# Patient Record
(demographics unavailable — no encounter records)

---

## 2024-10-31 NOTE — HISTORY OF PRESENT ILLNESS
[N] : Patient does not use contraception [Monogamous (Male Partner)] : is monogamous with a male partner [Y] : Positive pregnancy history [LMP unknown] : LMP unknown [Yes] : pregnancy [Currently Active] : currently active [Men] : men [Vaginal] : vaginal [No] : No [PGxTotal] : 7 [[facility]] : 3 [PGHxPremature] : 0 [PGHxAbortions] : 3 [[facility] Center[facility]] : 3 [PGHxABInduced] : 3 [FreeTextEntry1] : NSVDx3, TOPX3

## 2024-10-31 NOTE — PLAN
[FreeTextEntry1] : 30 y/o P3 at presenting with amenorrhea, LMP unknown, no prenatal care during pregnancy to date.   -f/u pap, GCT/CT   -f/u prenatal blood work drawn today   -Rx sent for PNV  - transabdominal U/S  revealed MADDY 02/14/2025, measuring 24.6 weeks today. limited U/S done   - PHQ-2  depression screen done, score 0   -f/u1 week for f/u anatomy and GCT/CBC

## 2024-10-31 NOTE — PHYSICAL EXAM
[Chaperone Present] : A chaperone was present in the examining room during all aspects of the physical examination [[id number]] : A chaperone was present during the pelvic exam. [Appropriately responsive] : appropriately responsive [Alert] : alert [No Acute Distress] : no acute distress [No Lymphadenopathy] : no lymphadenopathy [Soft] : soft [Non-tender] : non-tender [Non-distended] : non-distended [No HSM] : No HSM [No Lesions] : no lesions [No Mass] : no mass [Oriented x3] : oriented x3 [Examination Of The Breasts] : a normal appearance [No Masses] : no breast masses were palpable [Labia Majora] : normal [Labia Minora] : normal [Discharge] : discharge [Moderate] : moderate [Glory] : yellow [Thick] : thick [Normal] : normal [Uterine Adnexae] : normal [FreeTextEntry2] : Lynette